# Patient Record
Sex: MALE | Race: OTHER | HISPANIC OR LATINO | URBAN - METROPOLITAN AREA
[De-identification: names, ages, dates, MRNs, and addresses within clinical notes are randomized per-mention and may not be internally consistent; named-entity substitution may affect disease eponyms.]

---
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
## 2025-01-07 ENCOUNTER — EMERGENCY (EMERGENCY)
Facility: HOSPITAL | Age: 20
LOS: 0 days | Discharge: HOME | End: 2025-01-07
Attending: EMERGENCY MEDICINE
Payer: SELF-PAY

## 2025-01-07 VITALS
DIASTOLIC BLOOD PRESSURE: 63 MMHG | TEMPERATURE: 98 F | HEART RATE: 58 BPM | RESPIRATION RATE: 18 BRPM | OXYGEN SATURATION: 100 % | SYSTOLIC BLOOD PRESSURE: 100 MMHG

## 2025-01-07 VITALS — HEIGHT: 63 IN | WEIGHT: 121.92 LBS

## 2025-01-07 DIAGNOSIS — R07.89 OTHER CHEST PAIN: ICD-10-CM

## 2025-01-07 PROCEDURE — 93005 ELECTROCARDIOGRAM TRACING: CPT

## 2025-01-07 PROCEDURE — 71046 X-RAY EXAM CHEST 2 VIEWS: CPT | Mod: 26

## 2025-01-07 PROCEDURE — 93010 ELECTROCARDIOGRAM REPORT: CPT

## 2025-01-07 PROCEDURE — 99283 EMERGENCY DEPT VISIT LOW MDM: CPT | Mod: 25

## 2025-01-07 PROCEDURE — 71046 X-RAY EXAM CHEST 2 VIEWS: CPT

## 2025-01-07 PROCEDURE — 99284 EMERGENCY DEPT VISIT MOD MDM: CPT

## 2025-01-07 NOTE — ED STATDOCS - CLINICAL SUMMARY MEDICAL DECISION MAKING FREE TEXT BOX
Pt complaining of L-sided chest pain radiating to back x a few days, concerned about his lung. Pt does vape. O2 levels and breath sounds are normal. Will order EKG and chest x-ray.

## 2025-01-07 NOTE — ED STATDOCS - ATTENDING SHARED VISIT SELECTOR YES
So, I hate to bother you again about this patient, but did they just approve CPT code 39231 and not 52365? Just checking, thanks!!   Donel Duane Yes

## 2025-01-07 NOTE — ED ADULT NURSE NOTE - OBJECTIVE STATEMENT
Pt is a 19yr old male, A&OX4 and ambulatory, c/o L sided lung pain x5 days. Atraumatic. Pain is worse upon inhalation. Admits to vaping daily. Denies SOB, cough, and fever. Pt in no acute distress.

## 2025-01-07 NOTE — ED ADULT NURSE NOTE - NS ED NURSE LEVEL OF CONSCIOUSNESS AFFECT
Problem: Infection, Risk/Actual (Adult)  Goal: Identify Related Risk Factors and Signs and Symptoms  Related risk factors and signs and symptoms are identified upon initiation of Human [employer] Clinical Practice Guideline (CPG).   D.  Pt. Had a blood sugar of 56 before supper.  Received glucose gel and drank 4 juices with sugar.  Finger stickes were 65 then 71 and 74.  Pt. Then received one half amp of d-50 and 15 minutes later her blood sugar was 167.  Pt. Able to eat some of her supper tray.  Her finger stick at bed time was 177.  Did not receive any insulin this shift.  The Tube feeding was running at 50 cc an hour all shift.  Vitals stable.  Pivoting to the commode with mostly just her toes on the Left foot.  Dressings clean and dry on her feet.    P. Continue to monitor and continue plan of care.       Calm

## 2025-01-07 NOTE — ED STATDOCS - ATTENDING APP SHARED VISIT CONTRIBUTION OF CARE
I,Johnny Amaro MD,  performed the initial face to face bedside interview with this patient regarding history of present illness, review of symptoms and relevant past medical, social and family history.  I completed an independent physical examination.  I was the initial provider who evaluated this patient. I have signed out the follow up of any pending tests (i.e. labs, radiological studies) to the ACP.  I have communicated the patient’s plan of care and disposition with the ACP.  The history, relevant review of systems, past medical and surgical history, medical decision making, and physical examination was documented by the scribe in my presence and I attest to the accuracy of the documentation.

## 2025-01-07 NOTE — ED STATDOCS - NSFOLLOWUPINSTRUCTIONS_ED_ALL_ED_FT
STOP VAPING    Please follow-up with your doctor(s) within the next 3 days, but see medical sooner if your symptoms persist or worsen.  Please call tomorrow for an appointment.    You were given a copy of your labs and/or imaging.  Please go-over these with your doctor(s).     If you have any worsening of symptoms or any other concerns please see your doctor or return to the ED immediately.    Please continue taking your home medications as directed      In the Emergency Department today, we did not appreciate any abnormal findings on your xray. We will submit to our radiologist for FINAL review. If there are any new findings or concerning findings that were missed in the Emergency Department, we will notify you at the number provided upon registration.

## 2025-01-07 NOTE — ED ADULT TRIAGE NOTE - CHIEF COMPLAINT QUOTE
patient c/o left sided "lung pain" x 5 days.  reports pain exacerbated with deep inspiration radiating into left sided of mid back.  denies cough or recent falls.

## 2025-01-07 NOTE — ED STATDOCS - OBJECTIVE STATEMENT
20 y/o male with no pertinent PMHx  presents c/o L-sided lung pain x5d, reports that it is worse upon inhalation, and that the pain radiates to L side of mid back. Denies cough or fever. Pt has taken ibuprofen with little relief. Pt reportedly vapes regularly.  No other complaints at this time.

## 2025-01-07 NOTE — ED STATDOCS - PATIENT PORTAL LINK FT
You can access the FollowMyHealth Patient Portal offered by [facility] by registering at the following website: [email or web address]. By joining [employer]’s FollowMyHealth portal, you will also be able to view your health information using other applications (apps) compatible with our system.